# Patient Record
Sex: FEMALE | Race: WHITE | NOT HISPANIC OR LATINO | URBAN - NONMETROPOLITAN AREA
[De-identification: names, ages, dates, MRNs, and addresses within clinical notes are randomized per-mention and may not be internally consistent; named-entity substitution may affect disease eponyms.]

---
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
## 2025-01-09 ENCOUNTER — TELEPHONE (OUTPATIENT)
Dept: OBSTETRICS AND GYNECOLOGY | Facility: CLINIC | Age: 20
End: 2025-01-09
Payer: COMMERCIAL

## 2025-01-15 ENCOUNTER — TELEPHONE (OUTPATIENT)
Dept: PRIMARY CARE | Facility: CLINIC | Age: 20
End: 2025-01-15
Payer: COMMERCIAL

## 2025-01-15 NOTE — TELEPHONE ENCOUNTER
She needs refills on her ativan for a flight next Friday but she has not been seen for a year so should really have appt with PA to get in sooner or can do phoen with carlos

## 2025-01-17 ENCOUNTER — OFFICE VISIT (OUTPATIENT)
Dept: PRIMARY CARE | Facility: CLINIC | Age: 20
End: 2025-01-17
Payer: COMMERCIAL

## 2025-01-17 VITALS
SYSTOLIC BLOOD PRESSURE: 124 MMHG | DIASTOLIC BLOOD PRESSURE: 80 MMHG | WEIGHT: 182 LBS | OXYGEN SATURATION: 100 % | HEART RATE: 78 BPM | HEIGHT: 66 IN | BODY MASS INDEX: 29.25 KG/M2

## 2025-01-17 DIAGNOSIS — F40.243 ANXIETY WITH FLYING: ICD-10-CM

## 2025-01-17 PROCEDURE — 3008F BODY MASS INDEX DOCD: CPT

## 2025-01-17 PROCEDURE — 99213 OFFICE O/P EST LOW 20 MIN: CPT

## 2025-01-17 PROCEDURE — 1036F TOBACCO NON-USER: CPT

## 2025-01-17 RX ORDER — LORAZEPAM 1 MG/1
TABLET ORAL
Qty: 4 TABLET | Refills: 0 | Status: SHIPPED | OUTPATIENT
Start: 2025-01-17

## 2025-01-17 ASSESSMENT — PATIENT HEALTH QUESTIONNAIRE - PHQ9
2. FEELING DOWN, DEPRESSED OR HOPELESS: NOT AT ALL
1. LITTLE INTEREST OR PLEASURE IN DOING THINGS: NOT AT ALL
SUM OF ALL RESPONSES TO PHQ9 QUESTIONS 1 AND 2: 0

## 2025-01-17 NOTE — PATIENT INSTRUCTIONS
Take 1 tablet each way of your trip if needed  No driving or alcohol or other substances with it

## 2025-01-17 NOTE — PROGRESS NOTES
Subjective   Patient ID: Sandra Lewis is a 19 y.o. female who presents for Med Refill.  HPI    Review of Systems    Objective   Physical Exam    Assessment/Plan   {Assess/PlanSmartLinks:65138}         Jeff Man DO 01/17/25 8:40 AM

## 2025-01-17 NOTE — PROGRESS NOTES
"Subjective   Patient ID: Sandra Lewis is a 19 y.o. female who presents for Med Refill.  HPI  - going to [place] for the weekend,  is in the    - has fear of flying, uses 1 mg tablet of ativan   - no side effects in the past    Current Outpatient Medications:     LORazepam (Ativan) 1 mg tablet, TAKE 1 TABLET BY MOUTH 1 HOUR BEFORE FLIGHT. MAY REPEAT AS GETTING ON PLANE IF NEEDED, Disp: 4 tablet, Rfl: 0   History reviewed. No pertinent surgical history.   Past Medical History:   Diagnosis Date    Acne vulgaris 05/01/2023    Anxiety 05/01/2023    Cervical lymphadenopathy 05/01/2023    Depression 05/01/2023    Dysmenorrhea 05/01/2023    Globus sensation 05/01/2023    Hyperhidrosis 05/01/2023    Sore throat 07/26/2023     Social History     Tobacco Use    Smoking status: Never    Smokeless tobacco: Never   Vaping Use    Vaping status: Never Used   Substance Use Topics    Alcohol use: Never    Drug use: Never      No family history on file.   Review of Systems  10 point ROS negative except as otherwise noted in the HPI.      Objective   /80   Pulse 78   Ht 1.676 m (5' 6\")   Wt 82.6 kg (182 lb)   SpO2 100%   BMI 29.38 kg/m²    Physical Exam  Vitals reviewed.   Constitutional:       General: She is not in acute distress.     Appearance: Normal appearance. She is not ill-appearing.   HENT:      Head: Normocephalic and atraumatic.   Cardiovascular:      Rate and Rhythm: Normal rate and regular rhythm.      Heart sounds: Normal heart sounds.   Pulmonary:      Effort: Pulmonary effort is normal.      Breath sounds: Normal breath sounds.   Neurological:      Mental Status: She is alert and oriented to person, place, and time.   Psychiatric:         Attention and Perception: Attention normal.         Mood and Affect: Mood and affect normal.         Speech: Speech normal.         Behavior: Behavior normal. Behavior is cooperative.         Thought Content: Thought content normal. Thought content does " not include homicidal or suicidal ideation.         Judgment: Judgment normal.           Assessment/Plan   Problem List Items Addressed This Visit    None  Visit Diagnoses       Anxiety with flying      - Pt going to [place] this weekend. Uses ativan for fear of flying.   - 4 ativan prescribed   - no drinking with it, no driving while using    Relevant Medications    LORazepam (Ativan) 1 mg tablet            I have personally reviewed the OARRS report for this patient. The report is scanned into the electronic medical record. I have considered the risks of abuse, dependence, addiction and diversion. I believe that it is clinically appropriate for this patient to be prescribed this medication based on documented diagnosis of fear of flying. An updated contract between the patient and myself was signed, scanned into the electronic medical record, and the patient agrees to the terms. Any deviation from the agreement will result in termination from my practice.      Discussed at visit any disease processes that were of concern as well as the risks, benefits and instructions on any new medication provided. Patient (and/or caretaker of patient if present) stated all questions were answered, and they voiced understanding of instructions.     Arlene Victoria PA-C

## 2025-01-21 ENCOUNTER — APPOINTMENT (OUTPATIENT)
Dept: OBSTETRICS AND GYNECOLOGY | Facility: CLINIC | Age: 20
End: 2025-01-21
Payer: COMMERCIAL

## 2025-01-28 ENCOUNTER — APPOINTMENT (OUTPATIENT)
Dept: OBSTETRICS AND GYNECOLOGY | Facility: CLINIC | Age: 20
End: 2025-01-28
Payer: COMMERCIAL

## 2025-01-28 VITALS
SYSTOLIC BLOOD PRESSURE: 110 MMHG | WEIGHT: 178 LBS | DIASTOLIC BLOOD PRESSURE: 72 MMHG | BODY MASS INDEX: 28.61 KG/M2 | HEIGHT: 66 IN

## 2025-01-28 DIAGNOSIS — Z30.09 BIRTH CONTROL COUNSELING: Primary | ICD-10-CM

## 2025-01-28 PROCEDURE — 3008F BODY MASS INDEX DOCD: CPT | Performed by: NURSE PRACTITIONER

## 2025-01-28 PROCEDURE — 1036F TOBACCO NON-USER: CPT | Performed by: NURSE PRACTITIONER

## 2025-01-28 PROCEDURE — 99203 OFFICE O/P NEW LOW 30 MIN: CPT | Performed by: NURSE PRACTITIONER

## 2025-01-28 RX ORDER — NORETHINDRONE ACETATE AND ETHINYL ESTRADIOL 1MG-20(21)
1 KIT ORAL DAILY
Qty: 90 TABLET | Refills: 3 | Status: SHIPPED | OUTPATIENT
Start: 2025-01-28 | End: 2026-01-28

## 2025-01-28 ASSESSMENT — ENCOUNTER SYMPTOMS
NEUROLOGICAL NEGATIVE: 1
ENDOCRINE NEGATIVE: 1
CARDIOVASCULAR NEGATIVE: 1
MUSCULOSKELETAL NEGATIVE: 1
GASTROINTESTINAL NEGATIVE: 1
EYES NEGATIVE: 1
HEMATOLOGIC/LYMPHATIC NEGATIVE: 1
PSYCHIATRIC NEGATIVE: 1
CONSTITUTIONAL NEGATIVE: 1
RESPIRATORY NEGATIVE: 1
ALLERGIC/IMMUNOLOGIC NEGATIVE: 1

## 2025-01-28 NOTE — PROGRESS NOTES
"Chief Complaint    NEW PT CONSULT ON BIRTH CONTROL        HPI    - WAS ON ONLY ONE PILL BEFORE - LORYNA - DIDN'T LIKE THAT PILL  Last edited by Lee Trevino MA on 1/28/2025  1:28 PM.         19 y.o. G0 female presents for birth control counseling.   She is new with the practice.   Patient states she desires birth control for contraception.   Was on the pill in the past, Loryna and had side effects of mood changes.   Her menstrual cycles are regular every 28 days, lasting 5 days.   Reports flow is moderate-heavy. Changes protection every 2-3 hours at heaviest.    Dysmenorrhea: Yes. Takes Ibuprofen as needed which helps.   She is , sexually active.  is in .   Condoms: Yes  No h/o STDs.  STD testing: Declines  Patient is a non-smoker.   PMH: None    /72   Ht 1.676 m (5' 6\")   Wt 80.7 kg (178 lb)   LMP 01/22/2025   BMI 28.73 kg/m²      Current Outpatient Medications   Medication Instructions    LORazepam (Ativan) 1 mg tablet TAKE 1 TABLET BY MOUTH 1 HOUR BEFORE FLIGHT. MAY REPEAT AS GETTING ON PLANE IF NEEDED      Review of Systems   Constitutional: Negative.    HENT: Negative.     Eyes: Negative.    Respiratory: Negative.     Cardiovascular: Negative.    Gastrointestinal: Negative.    Endocrine: Negative.    Genitourinary: Negative.    Musculoskeletal: Negative.    Skin: Negative.    Allergic/Immunologic: Negative.    Neurological: Negative.    Hematological: Negative.    Psychiatric/Behavioral: Negative.     All other systems reviewed and are negative.       Physical Exam  Constitutional:       Appearance: Normal appearance.   HENT:      Head: Normocephalic.      Nose: Nose normal.   Cardiovascular:      Rate and Rhythm: Normal rate and regular rhythm.   Pulmonary:      Effort: Pulmonary effort is normal.      Breath sounds: Normal breath sounds.   Abdominal:      General: Abdomen is flat.      Palpations: Abdomen is soft.   Genitourinary:     Comments: Pelvic exam " deferred  Musculoskeletal:         General: Normal range of motion.      Cervical back: Normal range of motion and neck supple.   Skin:     General: Skin is warm and dry.   Neurological:      Mental Status: She is alert.   Psychiatric:         Mood and Affect: Mood normal.         Behavior: Behavior normal.          Assessment/Plan:   1. Birth control counseling (Primary)  -Discussion in depth today regarding birth control options including the contraceptive pill, patch, vaginal ring, Depo-Provera injection, IUDs, or Nexplanon arm implant.  -Patient desires to start on birth control pills.   -Common side effects, benefits, and correct pill use reviewed. Risks discussed, ACHES reviewed.  -Counseled condom use.  -Rx sent: norethindrone-e.estradioL-iron (Junel FE 1/20) 1 mg-20 mcg (21)/75 mg (7) tablet; Take 1 tablet by mouth once daily.  Dispense: 90 tablet; Refill: 3   -Follow up yearly and as needed.

## 2025-03-26 ENCOUNTER — APPOINTMENT (OUTPATIENT)
Dept: PRIMARY CARE | Facility: CLINIC | Age: 20
End: 2025-03-26
Payer: COMMERCIAL

## 2025-03-26 VITALS
HEART RATE: 76 BPM | SYSTOLIC BLOOD PRESSURE: 110 MMHG | DIASTOLIC BLOOD PRESSURE: 72 MMHG | OXYGEN SATURATION: 98 % | WEIGHT: 178.6 LBS | BODY MASS INDEX: 28.83 KG/M2

## 2025-03-26 DIAGNOSIS — K64.9 HEMORRHOIDS, UNSPECIFIED HEMORRHOID TYPE: Primary | ICD-10-CM

## 2025-03-26 PROCEDURE — 1036F TOBACCO NON-USER: CPT

## 2025-03-26 PROCEDURE — 99213 OFFICE O/P EST LOW 20 MIN: CPT

## 2025-03-26 RX ORDER — HYDROCORTISONE 25 MG/G
CREAM TOPICAL 4 TIMES DAILY PRN
Qty: 30 G | Refills: 0 | Status: SHIPPED | OUTPATIENT
Start: 2025-03-26 | End: 2026-03-26

## 2025-03-26 ASSESSMENT — PATIENT HEALTH QUESTIONNAIRE - PHQ9
2. FEELING DOWN, DEPRESSED OR HOPELESS: NOT AT ALL
SUM OF ALL RESPONSES TO PHQ9 QUESTIONS 1 AND 2: 0
1. LITTLE INTEREST OR PLEASURE IN DOING THINGS: NOT AT ALL

## 2025-03-26 NOTE — PROGRESS NOTES
"Subjective   Patient ID: Sandra Lewis \"Danni" is a 19 y.o. female who presents for Hemorrhoids (External, no pain or itchy   just there.  ).  HPI  -has hemorrhoid externally for a few months   - not inflamed, itchy, painful  - does not bleed  - no drainage   - no constipation   - has never had one before   - tried preparation H with no improvement     Current Outpatient Medications:     LORazepam (Ativan) 1 mg tablet, TAKE 1 TABLET BY MOUTH 1 HOUR BEFORE FLIGHT. MAY REPEAT AS GETTING ON PLANE IF NEEDED, Disp: 4 tablet, Rfl: 0    norethindrone-e.estradioL-iron (Junel FE 1/20) 1 mg-20 mcg (21)/75 mg (7) tablet, Take 1 tablet by mouth once daily., Disp: 90 tablet, Rfl: 3    hydrocortisone (Anusol-HC) 2.5 % rectal cream, Insert into the rectum 4 times a day as needed for hemorrhoids (rectal discomfort). Apply to affected areas, Disp: 30 g, Rfl: 0   Past Surgical History:   Procedure Laterality Date    WISDOM TOOTH EXTRACTION        Past Medical History:   Diagnosis Date    Acne vulgaris 05/01/2023    Anxiety 05/01/2023    Cervical lymphadenopathy 05/01/2023    Depression 05/01/2023    Dysmenorrhea 05/01/2023    Globus sensation 05/01/2023    Hyperhidrosis 05/01/2023    Sore throat 07/26/2023     Social History     Tobacco Use    Smoking status: Never    Smokeless tobacco: Never   Vaping Use    Vaping status: Never Used   Substance Use Topics    Alcohol use: Never    Drug use: Never      Family History   Problem Relation Name Age of Onset    Hypertension Father      Leukemia Maternal Grandmother      Diabetes Maternal Grandfather      Stroke Paternal Grandmother      Stroke Paternal Grandfather        Review of Systems  10 point ROS negative except as otherwise noted in the HPI.      Objective   /72   Pulse 76   Wt 81 kg (178 lb 9.6 oz)   SpO2 98%   BMI 28.83 kg/m²    Physical Exam  Genitourinary:     Comments: One small internal hemorrhoid prolapsed at the 3 o clock position. Not inflamed, infected. "           Assessment/Plan   Problem List Items Addressed This Visit    None  Visit Diagnoses       Hemorrhoids, unspecified hemorrhoid type    -  Primary  - pt w one small internal hemorrhoid prolapsed. Non inflamed, itchy, painful, infected but bothersome to pt  - start anusol and educated on sitz baths.     Relevant Medications    hydrocortisone (Anusol-HC) 2.5 % rectal cream            Discussed at visit any disease processes that were of concern as well as the risks, benefits and instructions on any new medication provided. Patient (and/or caretaker of patient if present) stated all questions were answered, and they voiced understanding of instructions.     Arlene Victoria PA-C

## 2025-04-03 DIAGNOSIS — K64.9 HEMORRHOIDS, UNSPECIFIED HEMORRHOID TYPE: ICD-10-CM

## 2025-04-03 RX ORDER — HYDROCORTISONE 25 MG/G
CREAM TOPICAL 4 TIMES DAILY PRN
Qty: 30 G | Refills: 0 | Status: SHIPPED | OUTPATIENT
Start: 2025-04-03 | End: 2026-04-03

## 2025-06-18 ENCOUNTER — PATIENT MESSAGE (OUTPATIENT)
Dept: OBSTETRICS AND GYNECOLOGY | Facility: CLINIC | Age: 20
End: 2025-06-18
Payer: COMMERCIAL

## 2025-06-18 DIAGNOSIS — Z30.09 BIRTH CONTROL COUNSELING: ICD-10-CM

## 2025-06-18 RX ORDER — NORETHINDRONE ACETATE AND ETHINYL ESTRADIOL 1MG-20(21)
1 KIT ORAL DAILY
Qty: 90 TABLET | Refills: 2 | Status: SHIPPED | OUTPATIENT
Start: 2025-06-18 | End: 2026-06-18

## 2025-07-21 DIAGNOSIS — F40.243 ANXIETY WITH FLYING: ICD-10-CM

## 2025-07-21 RX ORDER — LORAZEPAM 1 MG/1
TABLET ORAL
Qty: 4 TABLET | Refills: 0 | Status: SHIPPED | OUTPATIENT
Start: 2025-07-21

## 2025-07-21 NOTE — TELEPHONE ENCOUNTER
I have personally reviewed the OARRS report for this patient. The report is scanned into the electronic medical record. I have considered the risks of abuse, dependence, addiction and diversion. I believe that it is clinically appropriate for this patient to be prescribed this medication based on documented diagnosis of anxiety. An updated contract between the patient and myself was signed, scanned into the electronic medical record, and the patient agrees to the terms. Any deviation from the agreement will result in termination from my practice.